# Patient Record
Sex: FEMALE | Race: BLACK OR AFRICAN AMERICAN | NOT HISPANIC OR LATINO | URBAN - METROPOLITAN AREA
[De-identification: names, ages, dates, MRNs, and addresses within clinical notes are randomized per-mention and may not be internally consistent; named-entity substitution may affect disease eponyms.]

---

## 2021-04-23 ENCOUNTER — INPATIENT (INPATIENT)
Facility: HOSPITAL | Age: 21
LOS: 3 days | Discharge: HOME | End: 2021-04-27
Attending: PSYCHIATRY & NEUROLOGY | Admitting: PSYCHIATRY & NEUROLOGY
Payer: MEDICAID

## 2021-04-23 VITALS
SYSTOLIC BLOOD PRESSURE: 113 MMHG | OXYGEN SATURATION: 97 % | RESPIRATION RATE: 18 BRPM | HEART RATE: 99 BPM | DIASTOLIC BLOOD PRESSURE: 65 MMHG | TEMPERATURE: 98 F

## 2021-04-23 DIAGNOSIS — F33.2 MAJOR DEPRESSIVE DISORDER, RECURRENT SEVERE WITHOUT PSYCHOTIC FEATURES: ICD-10-CM

## 2021-04-23 DIAGNOSIS — F32.9 MAJOR DEPRESSIVE DISORDER, SINGLE EPISODE, UNSPECIFIED: ICD-10-CM

## 2021-04-23 DIAGNOSIS — F43.10 POST-TRAUMATIC STRESS DISORDER, UNSPECIFIED: ICD-10-CM

## 2021-04-23 DIAGNOSIS — F41.9 ANXIETY DISORDER, UNSPECIFIED: ICD-10-CM

## 2021-04-23 LAB
ALBUMIN SERPL ELPH-MCNC: 4.3 G/DL — SIGNIFICANT CHANGE UP (ref 3.3–5)
ALP SERPL-CCNC: 77 U/L — SIGNIFICANT CHANGE UP (ref 40–120)
ALT FLD-CCNC: 13 U/L — SIGNIFICANT CHANGE UP (ref 4–33)
AMPHET UR-MCNC: NEGATIVE — SIGNIFICANT CHANGE UP
ANION GAP SERPL CALC-SCNC: 10 MMOL/L — SIGNIFICANT CHANGE UP (ref 7–14)
APAP SERPL-MCNC: <15 UG/ML — SIGNIFICANT CHANGE UP (ref 15–25)
APPEARANCE UR: CLEAR — SIGNIFICANT CHANGE UP
AST SERPL-CCNC: 14 U/L — SIGNIFICANT CHANGE UP (ref 4–32)
BACTERIA # UR AUTO: NEGATIVE — SIGNIFICANT CHANGE UP
BARBITURATES UR SCN-MCNC: NEGATIVE — SIGNIFICANT CHANGE UP
BENZODIAZ UR-MCNC: NEGATIVE — SIGNIFICANT CHANGE UP
BILIRUB SERPL-MCNC: 0.3 MG/DL — SIGNIFICANT CHANGE UP (ref 0.2–1.2)
BILIRUB UR-MCNC: NEGATIVE — SIGNIFICANT CHANGE UP
BUN SERPL-MCNC: 8 MG/DL — SIGNIFICANT CHANGE UP (ref 7–23)
CALCIUM SERPL-MCNC: 9.4 MG/DL — SIGNIFICANT CHANGE UP (ref 8.4–10.5)
CHLORIDE SERPL-SCNC: 103 MMOL/L — SIGNIFICANT CHANGE UP (ref 98–107)
CO2 SERPL-SCNC: 25 MMOL/L — SIGNIFICANT CHANGE UP (ref 22–31)
COCAINE METAB.OTHER UR-MCNC: NEGATIVE — SIGNIFICANT CHANGE UP
COLOR SPEC: YELLOW — SIGNIFICANT CHANGE UP
COVID-19 SPIKE DOMAIN AB INTERP: POSITIVE
COVID-19 SPIKE DOMAIN ANTIBODY RESULT: >250 U/ML — HIGH
CREAT SERPL-MCNC: 0.71 MG/DL — SIGNIFICANT CHANGE UP (ref 0.5–1.3)
CREATININE URINE RESULT, DAU: 322 MG/DL — SIGNIFICANT CHANGE UP
DIFF PNL FLD: NEGATIVE — SIGNIFICANT CHANGE UP
EPI CELLS # UR: 3 /HPF — SIGNIFICANT CHANGE UP (ref 0–5)
ETHANOL SERPL-MCNC: <10 MG/DL — SIGNIFICANT CHANGE UP
GLUCOSE SERPL-MCNC: 110 MG/DL — HIGH (ref 70–99)
GLUCOSE UR QL: NEGATIVE — SIGNIFICANT CHANGE UP
HCG SERPL-ACNC: <5 MIU/ML — SIGNIFICANT CHANGE UP
HCT VFR BLD CALC: 38.3 % — SIGNIFICANT CHANGE UP (ref 34.5–45)
HGB BLD-MCNC: 12.3 G/DL — SIGNIFICANT CHANGE UP (ref 11.5–15.5)
HYALINE CASTS # UR AUTO: 3 /LPF — SIGNIFICANT CHANGE UP (ref 0–7)
KETONES UR-MCNC: ABNORMAL
LEUKOCYTE ESTERASE UR-ACNC: NEGATIVE — SIGNIFICANT CHANGE UP
MCHC RBC-ENTMCNC: 27.6 PG — SIGNIFICANT CHANGE UP (ref 27–34)
MCHC RBC-ENTMCNC: 32.1 GM/DL — SIGNIFICANT CHANGE UP (ref 32–36)
MCV RBC AUTO: 86.1 FL — SIGNIFICANT CHANGE UP (ref 80–100)
METHADONE UR-MCNC: NEGATIVE — SIGNIFICANT CHANGE UP
NITRITE UR-MCNC: NEGATIVE — SIGNIFICANT CHANGE UP
NRBC # BLD: 0 /100 WBCS — SIGNIFICANT CHANGE UP
NRBC # FLD: 0 K/UL — SIGNIFICANT CHANGE UP
OPIATES UR-MCNC: NEGATIVE — SIGNIFICANT CHANGE UP
OXYCODONE UR-MCNC: NEGATIVE — SIGNIFICANT CHANGE UP
PCP SPEC-MCNC: SIGNIFICANT CHANGE UP
PCP UR-MCNC: NEGATIVE — SIGNIFICANT CHANGE UP
PH UR: 7 — SIGNIFICANT CHANGE UP (ref 5–8)
PLATELET # BLD AUTO: 426 K/UL — HIGH (ref 150–400)
POTASSIUM SERPL-MCNC: 4.4 MMOL/L — SIGNIFICANT CHANGE UP (ref 3.5–5.3)
POTASSIUM SERPL-SCNC: 4.4 MMOL/L — SIGNIFICANT CHANGE UP (ref 3.5–5.3)
PROT SERPL-MCNC: 7.4 G/DL — SIGNIFICANT CHANGE UP (ref 6–8.3)
PROT UR-MCNC: ABNORMAL
RBC # BLD: 4.45 M/UL — SIGNIFICANT CHANGE UP (ref 3.8–5.2)
RBC # FLD: 12 % — SIGNIFICANT CHANGE UP (ref 10.3–14.5)
RBC CASTS # UR COMP ASSIST: 2 /HPF — SIGNIFICANT CHANGE UP (ref 0–4)
SALICYLATES SERPL-MCNC: <5 MG/DL — LOW (ref 15–30)
SARS-COV-2 IGG+IGM SERPL QL IA: >250 U/ML — HIGH
SARS-COV-2 IGG+IGM SERPL QL IA: POSITIVE
SARS-COV-2 RNA SPEC QL NAA+PROBE: SIGNIFICANT CHANGE UP
SODIUM SERPL-SCNC: 138 MMOL/L — SIGNIFICANT CHANGE UP (ref 135–145)
SP GR SPEC: 1.03 — HIGH (ref 1.01–1.02)
THC UR QL: NEGATIVE — SIGNIFICANT CHANGE UP
TOXICOLOGY SCREEN, DRUGS OF ABUSE, SERUM RESULT: SIGNIFICANT CHANGE UP
TSH SERPL-MCNC: 0.72 UIU/ML — SIGNIFICANT CHANGE UP (ref 0.27–4.2)
UROBILINOGEN FLD QL: SIGNIFICANT CHANGE UP
WBC # BLD: 10.04 K/UL — SIGNIFICANT CHANGE UP (ref 3.8–10.5)
WBC # FLD AUTO: 10.04 K/UL — SIGNIFICANT CHANGE UP (ref 3.8–10.5)
WBC UR QL: 2 /HPF — SIGNIFICANT CHANGE UP (ref 0–5)

## 2021-04-23 PROCEDURE — 99285 EMERGENCY DEPT VISIT HI MDM: CPT

## 2021-04-23 PROCEDURE — 99285 EMERGENCY DEPT VISIT HI MDM: CPT | Mod: 25

## 2021-04-23 PROCEDURE — 93010 ELECTROCARDIOGRAM REPORT: CPT

## 2021-04-23 RX ORDER — SODIUM CHLORIDE 9 MG/ML
1000 INJECTION INTRAMUSCULAR; INTRAVENOUS; SUBCUTANEOUS ONCE
Refills: 0 | Status: COMPLETED | OUTPATIENT
Start: 2021-04-23 | End: 2021-04-23

## 2021-04-23 RX ORDER — BUDESONIDE AND FORMOTEROL FUMARATE DIHYDRATE 160; 4.5 UG/1; UG/1
2 AEROSOL RESPIRATORY (INHALATION)
Qty: 0 | Refills: 0 | DISCHARGE

## 2021-04-23 RX ORDER — BUDESONIDE AND FORMOTEROL FUMARATE DIHYDRATE 160; 4.5 UG/1; UG/1
2 AEROSOL RESPIRATORY (INHALATION)
Refills: 0 | Status: DISCONTINUED | OUTPATIENT
Start: 2021-04-23 | End: 2021-04-27

## 2021-04-23 RX ORDER — FLUOXETINE HCL 10 MG
10 CAPSULE ORAL DAILY
Refills: 0 | Status: DISCONTINUED | OUTPATIENT
Start: 2021-04-23 | End: 2021-04-24

## 2021-04-23 RX ORDER — ALBUTEROL 90 UG/1
2 AEROSOL, METERED ORAL EVERY 6 HOURS
Refills: 0 | Status: DISCONTINUED | OUTPATIENT
Start: 2021-04-23 | End: 2021-04-27

## 2021-04-23 RX ORDER — ACETAMINOPHEN 500 MG
650 TABLET ORAL ONCE
Refills: 0 | Status: COMPLETED | OUTPATIENT
Start: 2021-04-23 | End: 2021-04-23

## 2021-04-23 RX ORDER — HYDROXYZINE HCL 10 MG
50 TABLET ORAL EVERY 6 HOURS
Refills: 0 | Status: DISCONTINUED | OUTPATIENT
Start: 2021-04-23 | End: 2021-04-27

## 2021-04-23 RX ADMIN — Medication 650 MILLIGRAM(S): at 22:08

## 2021-04-23 RX ADMIN — Medication 650 MILLIGRAM(S): at 21:58

## 2021-04-23 RX ADMIN — SODIUM CHLORIDE 1000 MILLILITER(S): 9 INJECTION INTRAMUSCULAR; INTRAVENOUS; SUBCUTANEOUS at 13:32

## 2021-04-23 NOTE — ED BEHAVIORAL HEALTH ASSESSMENT NOTE - DETAILS
zoloft- fatigue, drowsiness self referred; aunt OD on tramadol last night reports significant trauma hx- refused to disclose details EMMETT EMMETT Temple reports trauma hx- refused to disclose details

## 2021-04-23 NOTE — ED BEHAVIORAL HEALTH NOTE - BEHAVIORAL HEALTH NOTE
COVID Exposure Screen- Patient    1.	*Have you had a COVID-19 test in the last 90 days?  (x  ) Yes   (  ) No   (  ) Unknown- Reason: _____  IF YES PROCEED TO QUESTION #2. IF NO OR UNKNOWN, PLEASE SKIP TO QUESTION #3.  2.	Date of test(s) and result(s): 3 months ago- +     3.	*Have you tested positive for COVID-19 antibodies? ( x ) Yes   (  ) No   (  ) Unknown- Reason: _____  IF YES PROCEED TO QUESTION #4. IF NO or UNKNOWN, PLEASE SKIP TO QUESTION #5.  4.	Date of positive antibody test: ________    5.	*Have you received 2 doses of the COVID-19 vaccine? (  ) Yes   (x  ) No   (  ) Unknown- Reason: _____   IF YES PROCEED TO QUESTION #6. IF NO or UNKNOWN, PLEASE SKIP TO QUESTION #7.  6.	Date of second dose: ________    7.	*In the past 10 days, have you been around anyone with a positive COVID-19 test?* (  ) Yes   ( x ) No   (  ) Unknown- Reason: ____  IF YES PROCEED TO QUESTION #8. IF NO or UNKNOWN, PLEASE SKIP TO QUESTION #13.  8.	Were you within 6 feet of them for at least 15 minutes? (  ) Yes   (  ) No   (  ) Unknown- Reason: _____  9.	Have you provided care for them? (  ) Yes   (  ) No   (  ) Unknown- Reason: ______  10.	Have you had direct physical contact with them (touched, hugged, or kissed them)? (  ) Yes   (  ) No    (  ) Unknown- Reason: _____  11.	Have you shared eating or drinking utensils with them? (  ) Yes   (  ) No    (  ) Unknown- Reason: ____  12.	Have they sneezed, coughed, or somehow gotten respiratory droplets on you? (  ) Yes   (  ) No    (  ) Unknown- Reason: ______    13.	*Have you been out of [place] within the past 10 days?* (  ) Yes   ( x ) No   (  ) Unknown- Reason: _____  IF YES PLEASE ANSWER THE FOLLOWING QUESTIONS:  14.	Which state/country have you been to? ______  15.	Were you there over 24 hours? (  ) Yes   (  ) No    (  ) Unknown- Reason: ______  16.	Date of return to [facility]: ______

## 2021-04-23 NOTE — ED BEHAVIORAL HEALTH ASSESSMENT NOTE - NSPRESENTSXS_PSY_ALL_CORE
Depressed mood/Anhedonia/Impulsivity/Hopelessness or despair/Global insomnia/Severe anxiety, agitation or panic

## 2021-04-23 NOTE — ED BEHAVIORAL HEALTH ASSESSMENT NOTE - CASE SUMMARY
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 PPH MDD, Anxiety & PTSD. No hx of inpatient admissions or suicide attempts. No history of aggression, violence or legal issues. No substance abuse problems. PMH asthma & PCOS. BIBA self referred for suicide attempt.    Patient presents to the ED in th context of suicide attempt. Patient reports taking 5 Tramdol 350mg pills last evening in a suicide attempt. Reviewed psyckes- patient was prescribed tramadol 50mg in March (likely took 5 50mg pills= 250mg total). Patient's purposeful OD precipitated by multiple psychosocial stressors and continues psychodynamic issues and homelessness. Although she denies current SI/plan/intent, she endorses worsening depression and anxiety. She has significant risk factors including limited social supports, homelessness, no protective factors and non compliance with medication. She is impulsive and unpredictable with poor judgement and insight. She presents at imminent risk to self and requires inpatient admission for safety and stabilization.

## 2021-04-23 NOTE — ED BEHAVIORAL HEALTH ASSESSMENT NOTE - NSACTIVEVENT_PSY_ALL_CORE
psychodynamic issues/Triggering events leading to humiliation, shame, and/or despair (e.g., Loss of relationship, financial or health status) (real or anticipated)/Current or pending social isolation/Pending incarceration or homelessness/Inadequate social supports

## 2021-04-23 NOTE — ED PROVIDER NOTE - PROGRESS NOTE DETAILS
Yosvany: Psychiatry consults, will see patient shortly picked up patient as signout at 4pm, awaiting psych input.  psych replied pt to be admitted, will send pt to  awaiting covid test results

## 2021-04-23 NOTE — ED PROVIDER NOTE - OBJECTIVE STATEMENT
21yo female with pmh asthma, pcos presenting after ingestion.  States last night around 11pm she took 6 350mg tabs of tramadol.  States this was an attempt to take her life.  She has had these thoughts before but never acted on them.  No other meds, drugs, alcohol.  Endorsing nausea, dizziness at this time. 21yo female with pmh asthma, pcos presenting after self-reported ingestion last night around 11pm - took 6 350mg tabs of tramadol.  States this was an attempt to take her life.  She has had these thoughts before but never acted on them until now. States she has had increasing depression and life stressors that are contributing to her worsening mood. States she had the tramadol rx from an ER in [place] for her PCOS.  No other meds, drugs, alcohol.  Endorsing nausea, dizziness at this time.      JUANITA

## 2021-04-23 NOTE — ED ADULT TRIAGE NOTE - CHIEF COMPLAINT QUOTE
BIBEMS from home for taking multiple tramadol last night at 11 pm. States she took 6 pills of 350mg each. Currently c/o nausea, fatigue, weakness. Appears weak. Reports shes been having increased feelings of depression and had suicidal thoughts. No hx of previous attempts or psychiatric admission. Cooperative in triage    Evaluated by MD. EMILEE Aparicio, pt to be seen in [facility]

## 2021-04-23 NOTE — ED PROVIDER NOTE - PHYSICAL EXAMINATION
General appearance: NAD, conversant, afebrile    Eyes: anicteric sclerae, CRISTY, EOMI   HENT: Atraumatic; oropharynx clear, MMM and no ulcerations, no pharyngeal erythema or exudate   Neck: Trachea midline; Full range of motion, supple   Pulm: CTA bl, normal respiratory effort and no intercostal retractions, normal work of breathing   CV: RRR, No murmurs, rubs, or gallops   Abdomen: Soft, non-tender, non-distended; no guarding or rebound   Extremities: No peripheral edema   Skin: Dry, normal temperature, turgor and texture; no rash   Psych: Appropriate affect, cooperative; alert and oriented to person, place and time General appearance: NAD, conversant, afebrile    Eyes: anicteric sclerae, CRISTY, EOMI   HENT: Atraumatic; oropharynx clear, MMM and no ulcerations, no pharyngeal erythema or exudate   Neck: Trachea midline; Full range of motion, supple   Pulm: CTA bl, normal respiratory effort and no intercostal retractions, normal work of breathing   CV: RRR, No murmurs, rubs, or gallops   Abdomen: Soft, non-tender, non-distended; no guarding or rebound   Extremities: No peripheral edema   Skin: Dry, normal temperature, turgor and texture; no rash   Psych: Appropriate affect, cooperative; alert and oriented to person, place and time, +SI, denies HI    JUANITA

## 2021-04-23 NOTE — ED BEHAVIORAL HEALTH ASSESSMENT NOTE - NSSUICPROTFACT_PSY_ALL_CORE
Responsibility to children, family, or others/Engaged in work or school/Positive therapeutic relationships

## 2021-04-23 NOTE — ED PROVIDER NOTE - CARE PLAN
Principal Discharge DX:	MDD (major depressive disorder)  Secondary Diagnosis:	Anxiety disorder, unspecified type  Secondary Diagnosis:	PTSD (post-traumatic stress disorder)

## 2021-04-23 NOTE — ED BEHAVIORAL HEALTH ASSESSMENT NOTE - HPI (INCLUDE ILLNESS QUALITY, SEVERITY, DURATION, TIMING, CONTEXT, MODIFYING FACTORS, ASSOCIATED SIGNS AND SYMPTOMS)
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 PPH MDD, Anxiety & PTSD. No hx of inpatient admissions or suicide attempts. No history of aggression, violence or legal issues. No substance abuse problems. PMH asthma & PCOS. BIBA self referred for suicide attempt.    Patient reports last night she took 5 Tramadol pills "it stated 350mg on the bottle." Per Psyckes patient was prescribed Tramadol 50mg in March 2021. Patient reports she was prescribed this medication for PCOS pain. She reports she took a purposeful OD of this medication to kill herself. She reports she felt overwhelmed and stressed "about everything."    Patient endorses precipitating factors include her homelessness. She stated it is extremely stressing "not knowing where I'll lay my head every night." She reports she has a strained relationship with her family. She stated she was kicked out of her mothers house at age 12, has no relationship with her father and mostly stays between her Aunt & Stepmother. She stated "they're also toxic." She has siblings but reports their interactions were minimal. She was guarded when discussing their family. Patient has been unable to get an apartment due to just recently starting work in March and also going to school full time. She works overnights and attends school during the day. Patient reports what led her to her suicide attempt last night was finding out from a friend that her stepmother doesn't want her to stay with her anymore and was saying mean things about her. She reports her stepmother was a support and finding this information out was extremely hurtful. She reports she impulsively took the OD of tramadol but did intend to kill herself.     She reports upon awakening this AM she felt physically ill and called 911. Patient denies continued SI/plan/intent. However she is unable to name any social supports. She has few protective factors stating all she has is herself. She was tearful. She reports she has worsening depression and anxiety due to "drama and stress," with her family. She refused to elaborate regarding other recent events with her family. She stated she is not attending class online because "I don't have the energy," however she continues to do well in her school work. She endorsed feelings of hopelessness regarding her living situation and issues with her family.    Patient denies any hallucinations, does not report any delusional thought content, denies thought insertion/withdrawal, denies referential thought processes & is not paranoid on interview. Patient does not report nor exhibit any signs of pratima, including irritable or elevated mood, grandiosity, pressured speech, risk-taking behaviors, increase in productivity or agitation. Patient denies  preoccupation with death or feelings of guilt. Patient adamantly denies SI, intent or plan; denies any HI, violent thoughts.     See  note for collateral information. 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 PPH MDD, Anxiety & PTSD. No hx of inpatient admissions or suicide attempts. No history of aggression, violence or legal issues. No substance abuse problems. PMH asthma & PCOS. BIBA self referred for suicide attempt.    Patient reports last night she took 5 Tramadol pills "it stated 350mg on the bottle." Per Psyckes patient was prescribed Tramadol 50mg in March 2021. Patient reports she was prescribed this medication for PCOS pain. She reports she took a purposeful OD of 5 pills this medication to kill herself. She reports she felt overwhelmed and stressed "about everything."    Patient endorses precipitating factors include her homelessness. She stated it is extremely stressing "not knowing where I'll lay my head every night." She reports she has a strained relationship with her family. She stated she was kicked out of her mothers house at age 12, has no relationship with her father and mostly stays between her Aunt & Stepmother. She stated "they're also toxic." She has siblings but reports their interactions are minimal. She was guarded when discussing family. Patient has been unable to get an apartment due to just recently starting work in March and also going to school full time. She works overnights and attends school during the day. Patient reports what led her to her suicide attempt last night was finding out from a friend that her stepmother doesn't want her to stay with her anymore and was saying mean things about her. She reports her stepmother was a support and finding this information out was extremely hurtful. She reports she impulsively took the OD of tramadol but did intend to kill herself.     She reports upon awakening this AM she felt physically ill and called 911. Patient denies continued SI/plan/intent. However she is unable to name any social supports. She has few protective factors stating all she has is herself. She was tearful. She reports she has worsening depression and anxiety due to "drama and stress," with her family. She refused to elaborate regarding other recent events with her family. She stated she is not attending class online because "I don't have the energy," however she continues to do well in her school work. She endorsed feelings of hopelessness regarding her living situation and issues with her family.    Patient denies any hallucinations, does not report any delusional thought content, denies thought insertion/withdrawal, denies referential thought processes & is not paranoid on interview. Patient does not report nor exhibit any signs of pratima, including irritable or elevated mood, grandiosity, pressured speech, risk-taking behaviors, increase in productivity or agitation. Patient denies  preoccupation with death or feelings of guilt. Patient adamantly denies SI, intent or plan; denies any HI, violent thoughts.     See  note for collateral information.

## 2021-04-23 NOTE — ED BEHAVIORAL HEALTH NOTE - BEHAVIORAL HEALTH NOTE
Worker printed [email or web address] information for provider. Worker then met with patient who provided her aunt Natasha Villegas ([phone]) for collateral information.     Patient is a 20-year-old female, staying from place to place currently (undomiciled), in mental health treatment, with a dx of anxiety and depression, BIBEMS activated by self for overdosing on pills. Pt’s aunt reports that the patient follows up for therapy at [facility] for treatment and believes she is prescribed an antidepressant and anxiety medication. 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 Aunt reports that the patient is not on any drugs or alcohol. She reports that the patient can be verbally aggressive but is not physically violent with anyone. Aunt reports that the patient is unsettled by her living situation. She states that the patient has no legal issues. 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 Patient has also been taking classes online for early childhood intervention ([employer]?). She states that the patient was living with her mother prior to being undomiciled but they do not have the best relationship. She states that the patient tested positive for covid-19 in February and has not taken the vaccine yet. She states that has no covid-19 exposure. She reports that last night the patient called her and told her that she is tired and overwhelmed. Aunt states that she did not directly make suicidal statements but sounded sad. Aunt states that the patient called her this morning and told her that she took 6-7 pills of Tramadol and she told her to call EMS. Aunt states prior to this the patient has no hx of sa. She states that the patient suffers from PCOS. She believes that the patient was crying out for help. She states that the patient has no hx of prior hospitalizations.  Case discussed with PAVEL payne. Worker printed [employer] information for provider. Worker then met with patient who provided her aunt Natasha Villegas ([phone]) for collateral information.     Patient is a 20-year-old female, staying from place to place currently (undomiciled), in mental health treatment, with a dx of anxiety and depression, BIBEMS activated by self for overdosing on pills. Pt’s aunt reports that the patient follows up for therapy at [facility] for treatment and believes she is prescribed an antidepressant and anxiety medication. 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 Aunt reports that the patient is not on any drugs or alcohol. She reports that the patient can be verbally aggressive but is not physically violent with anyone. Aunt reports that the patient is unsettled by her living situation. She states that the patient has no legal issues. 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 Patient has also been taking classes online for early childhood intervention ([employer]?). She states that the patient was living with her mother prior to being undomiciled but they do not have the best relationship. She states that the patient tested positive for covid-19 in February and has not taken the vaccine yet. She states that has no covid-19 exposure. She reports that last night the patient called her and told her that she is tired and overwhelmed. Aunt states that she did not directly make suicidal statements but sounded sad. Aunt states that the patient called her this morning and told her that she took 6-7 pills of Tramadol and she told her to call EMS. Aunt states prior to this the patient has no hx of sa. She states that the patient suffers from PCOS. She believes that the patient was crying out for help. She states that the patient has no hx of prior hospitalizations.  Case discussed with PAVEL payne.  Worker called [facility] ([phone]) to contact pt’s therapist Maria Elena. Worker left voicemail for call back.

## 2021-04-23 NOTE — ED BEHAVIORAL HEALTH NOTE - BEHAVIORAL HEALTH NOTE
Worker received a call back from pt’s therapist libby  at [facility]. All information is as per therapist.   She reports that she last spoke with the patient via telehealth on Tuesday. She states that the patient has been having issues with her step mother as of recent who she was living with at the time. She states that the patient has been saving up money for housing and this has been a huge stressor for the patient. She reports that the patient is followed by NP omid Perales  and is prescribed Zoloft 100mg in the am and hydroxyzine 25mg at bedtime . Patient last met with the NP on 4/21.  She states that the pt Did have a past ideation in November and had  thoughts to take pills and go to a  hotel but at that time she was able to talk to the patient to provide support and the patient then was able to  contract for safety.  Worker explained that patient will be admitted. Pending admission to . Worker received a call back from pt’s therapist libby  at [facility]. All information is as per therapist.   She reports that she last spoke with the patient via telehealth on Tuesday. She states that the patient has been having issues with her step mother as of recent who she was living with at the time. She states that the patient has been saving up money for housing and this has been a huge stressor for the patient. She reports that the patient is followed by NP omid Perales  and is prescribed Zoloft 100mg in the am and hydroxyzine 25mg at bedtime . Patient last met with the NP on 4/21.  She states that the pt Did have a past ideation in November and had  thoughts to take pills and go to a  hotel but at that time she was able to talk to the patient to provide support and the patient then was able to  contract for safety.  Worker explained that patient will be admitted. Pending admission to .    Worker printed psyckes information for provider.

## 2021-04-23 NOTE — ED BEHAVIORAL HEALTH ASSESSMENT NOTE - DESCRIPTION
20 year old female, homeless, attends [employer] studying early childhood special education PCOS, asthma During course of ED visit patient was calm and cooperative. Patient was not aggressive or violent and did not require PRN medications.    ICU Vital Signs Last 24 Hrs  T(C): 36.7 (23 Apr 2021 15:54), Max: 36.9 (23 Apr 2021 11:30)  T(F): 98.1 (23 Apr 2021 15:54), Max: 98.5 (23 Apr 2021 11:30)  HR: 94 (23 Apr 2021 15:54) (94 - 99)  BP: 101/60 (23 Apr 2021 15:54) (97/55 - 113/65)  BP(mean): --  ABP: --  ABP(mean): --  RR: 17 (23 Apr 2021 15:54) (17 - 18)  SpO2: 99% (23 Apr 2021 15:54) (97% - 99%)

## 2021-04-23 NOTE — ED BEHAVIORAL HEALTH ASSESSMENT NOTE - OTHER
issues with social support, issues with housing, issues with primary support not assessed, in bed homeless aunt superficially cooperative, guarded

## 2021-04-23 NOTE — ED BEHAVIORAL HEALTH ASSESSMENT NOTE - SUMMARY
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 PPH MDD, Anxiety & PTSD. No hx of inpatient admissions or suicide attempts. No history of aggression, violence or legal issues. No substance abuse problems. PMH asthma & PCOS. BIBA self referred for suicide attempt.    Patient presents to the ED in th context of suicide attempt. Patient reports taking 5 Tramdol 350mg pills last evening in a suicide attempt. Reviewed psyckes- patient was prescribed tramadol 50mg in March (likely took 5 50mg pills= 250mg total). Patient's purposeful OD precipitated by multiple psychosocial stressors and continues psychodynamic issues and homelessness. Although she denies current SI/plan/intent, she endorses worsening depression and anxiety. She has significant risk factors including limited social supports, homelessness, no protective factors and non compliance with medication. She is impulsive and unpredictable with poor judgement and insight. She presents at imminent risk to self and requires inpatient admission for safety and stabilization. 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 PPH MDD, Anxiety & PTSD. No hx of inpatient admissions or suicide attempts. No history of aggression, violence or legal issues. No substance abuse problems. PMH asthma & PCOS. BIBA self referred for suicide attempt.    Patient presents to the ED in th context of suicide attempt. Patient reports taking 5 Tramdol 350mg pills last evening in a suicide attempt. Reviewed psyckes- patient was prescribed tramadol 50mg in March (likely took 5 50mg pills= 250mg total). Patient's purposeful OD precipitated by multiple psychosocial stressors and continued psychodynamic issues and homelessness. Although she denies current SI/plan/intent, she endorses worsening depression and anxiety. She has significant risk factors including limited social supports, homelessness, no protective factors, non compliance with medication and recent suicide attempt. She is impulsive and unpredictable with poor judgement and insight. She presents at imminent risk to self and requires inpatient admission for safety and stabilization.

## 2021-04-23 NOTE — ED PROVIDER NOTE - CLINICAL SUMMARY MEDICAL DECISION MAKING FREE TEXT BOX
21yo female with pmh asthma, pcos presenting after ingestion of tramadol as suicide attempt.  >12 hours since ingestion.  EKG NSR, qtc normal.  Will check screening labs and drug screen.  Psych consult. 19yo female with pmh asthma, pcos presenting after ingestion of tramadol as suicide attempt.  >12 hours since ingestion.  EKG NSR, qtc normal.  Will check screening labs and drug screen.  Psych consult.     Of note, pt states she took 350mg tablets of tramadol but they do not appear to come in this formulation of mg.

## 2021-04-23 NOTE — ED PROVIDER NOTE - NS ED ROS FT
Constitutional: No fevers, no chills  Resp: No shortness of breath, no cough  Cardio: No chest pain, no palpitations, no peripheral edema, no syncope  GI: No abdominal pain, + nausea, no vomiting, no diarrhea  : No dysuria, no urinary frequency  MSK: No back pain, no neck pain  Skin: No rash, no lacerations, no bruising  Neuro: No headache, no numbness, no weakness
Name band;

## 2021-04-23 NOTE — ED BEHAVIORAL HEALTH ASSESSMENT NOTE - RISK ASSESSMENT
modifiable risk factors- depression, anxiety, hopelessness, poor appetite/sleep, non compliance with medication, impulsive behavior, poor coping skills, limited support    unmodifiable risk factors- hx of trauma, recent suicide attempt    protective factors- no hx of admissions, no pratima/hypomania, no psychosis, no legal issues, no substance abuse issues High Acute Suicide Risk

## 2021-04-23 NOTE — ED BEHAVIORAL HEALTH ASSESSMENT NOTE - OTHER PAST PSYCHIATRIC HISTORY (INCLUDE DETAILS REGARDING ONSET, COURSE OF ILLNESS, INPATIENT/OUTPATIENT TREATMENT)
PPH MDD, Anxiety & PTSD. No hx of inpatient admissions or suicide attempts. Outpatient tx at [facility] with therapist Maria Elena. Psychiatrist recently changed- does not recall name.

## 2021-04-23 NOTE — ED BEHAVIORAL HEALTH NOTE - BEHAVIORAL HEALTH NOTE
Worker called [facility] first ([phone]) and spoke to Marcos DUMONT who provided reference #[id number] who states that [facility] first will reach out on the next business day and can also be reached at [phone] Worker called [employer] first ([phone]) and spoke to Marcos DUMONT who provided reference #[id number] who states that [facility] first will reach out on the next business day and can also be reached at [phone]. Worker called [facility] ([phone]) and Spoke to Meli who will inform pt’s therapist of admission to [facility].  Worker called patient’s aunt Natasha ([phone]) and left a message informing about pt admission.

## 2021-04-24 LAB
CULTURE RESULTS: SIGNIFICANT CHANGE UP
SPECIMEN SOURCE: SIGNIFICANT CHANGE UP

## 2021-04-24 PROCEDURE — 99222 1ST HOSP IP/OBS MODERATE 55: CPT

## 2021-04-24 RX ORDER — ACETAMINOPHEN 500 MG
650 TABLET ORAL EVERY 6 HOURS
Refills: 0 | Status: DISCONTINUED | OUTPATIENT
Start: 2021-04-24 | End: 2021-04-27

## 2021-04-24 RX ADMIN — BUDESONIDE AND FORMOTEROL FUMARATE DIHYDRATE 2 PUFF(S): 160; 4.5 AEROSOL RESPIRATORY (INHALATION) at 21:49

## 2021-04-24 RX ADMIN — Medication 650 MILLIGRAM(S): at 17:04

## 2021-04-24 NOTE — BH INPATIENT PSYCHIATRY ASSESSMENT NOTE - RISK ASSESSMENT
elevated acute suicide risk due to attempt prior to admission and poor social supports and numerous stressors.

## 2021-04-24 NOTE — BH INPATIENT PSYCHIATRY ASSESSMENT NOTE - NSBHCHARTREVIEWVS_PSY_A_CORE FT
Vital Signs Last 24 Hrs  T(C): 36.9 (23 Apr 2021 21:50), Max: 36.9 (23 Apr 2021 21:50)  T(F): 98.4 (23 Apr 2021 21:50), Max: 98.4 (23 Apr 2021 21:50)  HR: 89 (23 Apr 2021 19:36) (89 - 97)  BP: 98/51 (23 Apr 2021 19:36) (97/55 - 101/60)  BP(mean): --  RR: 18 (23 Apr 2021 19:36) (17 - 18)  SpO2: 99% (23 Apr 2021 19:36) (98% - 99%)

## 2021-04-24 NOTE — PSYCHIATRIC REHAB INITIAL EVALUATION - NSBHPRTOOLBOX_PSY_ALL_CORE
Patient reports coping skills such as talking to her siblings, and spending time with family and friends./Family support/Friend support

## 2021-04-24 NOTE — PSYCHIATRIC REHAB INITIAL EVALUATION - NSBHPRRECOMMEND_PSY_ALL_CORE
Writer met with patient in order to orient patient to unit, and introduce patient to psychiatric staff and department functions. Patient was verbal, polite, and forthcoming with personal information. Patient denies SI and reports what she did was impulsive. Patient reports she has a lot to live for, and reports having "an amazing" support system. Writer collaborated with patient to select an appropriate psychiatric rehabilitation goal. Psychiatric rehabilitation staff will continue to engage patient daily in order to develop therapeutic rapport. In response to COVID19, unit programming will be re-evaluated on a consistent basis in effort to maintain safety guidelines.

## 2021-04-24 NOTE — BH INPATIENT PSYCHIATRY ASSESSMENT NOTE - NSBHASSESSSUMMFT_PSY_ALL_CORE
Patient now denying mood sxs, remains elevated risk.  Patient not interested.  in psych meds.      --Inpatient hospitalization for acute stabilization and treatment  --Individual, group, and milieu therapy  --Ongoing collaboration with family and outpatient providers.

## 2021-04-24 NOTE — PSYCHIATRIC REHAB INITIAL EVALUATION - NSBHSTRENGTHHOME_PSY_ALL_CORE
Patient is currently domiciled in multiple homes. Patient reports she is homeless at the moment, but reports bouncing from home to home between her stepmother, aunt, and grandmother.

## 2021-04-24 NOTE — BH PATIENT PROFILE - HOME MEDICATIONS
budesonide-formoterol 80 mcg-4.5 mcg/inh inhalation aerosol , 2 puff(s) inhaled 2 times a day  albuterol 90 mcg/inh inhalation aerosol , 2 puff(s) inhaled every 6 hours

## 2021-04-24 NOTE — BH INPATIENT PSYCHIATRY ASSESSMENT NOTE - MSE UNSTRUCTURED FT
Patient is calm, cooperative, appropriately behaved, well-related, good EC. No PMR/PMA, no abnormal movements. Speech is normal rate, volume, and tone. Mood "I feel fine today" and affect is euthymic, mood congruent and appropriate to content. TP linear and logical. Denies SI/HI/AVH/PI. Cognition grossly intact. I&J impaired

## 2021-04-24 NOTE — BH INPATIENT PSYCHIATRY ASSESSMENT NOTE - DESCRIPTION
20 year old female, homeless, attends [employer] studying early childhood special education

## 2021-04-24 NOTE — PSYCHIATRIC REHAB INITIAL EVALUATION - NSBHALCSUBTREAT_PSY_ALL_CORE
Patient was currently receiving outpatient therapy, and was taking psychotropic medication prior to admission.

## 2021-04-24 NOTE — BH INPATIENT PSYCHIATRY ASSESSMENT NOTE - CURRENT MEDICATION
MEDICATIONS  (STANDING):  budesonide  80 MICROgram(s)/formoterol 4.5 MICROgram(s) Inhaler 2 Puff(s) Inhalation two times a day  FLUoxetine 10 milliGRAM(s) Oral daily    MEDICATIONS  (PRN):  ALBUTerol    90 MICROgram(s) HFA Inhaler 2 Puff(s) Inhalation every 6 hours PRN asthma  hydrOXYzine hydrochloride 50 milliGRAM(s) Oral every 6 hours PRN Anxiety  LORazepam     Tablet 1 milliGRAM(s) Oral every 6 hours PRN agitation  LORazepam   Injectable 2 milliGRAM(s) IntraMuscular once PRN extreme anxiety/agitation

## 2021-04-24 NOTE — PSYCHIATRIC REHAB INITIAL EVALUATION - NSBHEMPSTRENGTHSFT_PSY_ALL_CORE
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 Patient also reports she enjoys going to work 
no

## 2021-04-24 NOTE — BH INPATIENT PSYCHIATRY ASSESSMENT NOTE - DETAILS
OD on tramadol last night reports trauma hx- refused to disclose details zoloft- fatigue, drowsiness
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
## 2021-04-25 PROCEDURE — 99232 SBSQ HOSP IP/OBS MODERATE 35: CPT

## 2021-04-25 RX ADMIN — BUDESONIDE AND FORMOTEROL FUMARATE DIHYDRATE 2 PUFF(S): 160; 4.5 AEROSOL RESPIRATORY (INHALATION) at 20:29

## 2021-04-25 RX ADMIN — BUDESONIDE AND FORMOTEROL FUMARATE DIHYDRATE 2 PUFF(S): 160; 4.5 AEROSOL RESPIRATORY (INHALATION) at 09:00

## 2021-04-26 PROCEDURE — 90853 GROUP PSYCHOTHERAPY: CPT

## 2021-04-26 PROCEDURE — 99232 SBSQ HOSP IP/OBS MODERATE 35: CPT | Mod: 25

## 2021-04-26 PROCEDURE — 90834 PSYTX W PT 45 MINUTES: CPT | Mod: 59

## 2021-04-26 RX ORDER — ALBUTEROL 90 UG/1
2 AEROSOL, METERED ORAL
Qty: 0 | Refills: 0 | DISCHARGE

## 2021-04-26 RX ADMIN — BUDESONIDE AND FORMOTEROL FUMARATE DIHYDRATE 2 PUFF(S): 160; 4.5 AEROSOL RESPIRATORY (INHALATION) at 20:22

## 2021-04-26 RX ADMIN — BUDESONIDE AND FORMOTEROL FUMARATE DIHYDRATE 2 PUFF(S): 160; 4.5 AEROSOL RESPIRATORY (INHALATION) at 09:20

## 2021-04-26 NOTE — BH PSYCHOLOGY - GROUP THERAPY NOTE - NSPSYCHOLGRPDBTPT_PSY_A_CORE FT
DBT Group is a group in which patients learn skills to better manage their emotions and behaviors. Group begins with a mindfulness practice so that patients have an opportunity to practice observing their internal and external experiences.  Following the mindfulness exercise the group learns new skills in a didactic format. Group today focused on the “distress tolerance” module, which are skills to help patients manage their crisis urges.  Specifically, pts learned the skill of “radical acceptance,” which includes accepting painful situations in their lives they cannot change through turning the mind and practicing willingness. Patients were asked to determine difficult situations in their lives they needed to work on accepting, and provided examples of ways to practice this while in the hospital.

## 2021-04-26 NOTE — BH DISCHARGE NOTE NURSING/SOCIAL WORK/PSYCH REHAB - PATIENT PORTAL LINK FT
You can access the FollowMyHealth Patient Portal offered by [facility] by registering at the following website: [email or web address]. By joining [facility]’s FollowMyHealth portal, you will also be able to view your health information using other applications (apps) compatible with our system.

## 2021-04-26 NOTE — BH SOCIAL WORK INITIAL PSYCHOSOCIAL EVALUATION - NSPTSTATEDGOAL_PSY_ALL_CORE
I feel that I've learned so much more about myself, this was impulsive and I feel I have the ability to deal with my life stressors.
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
## 2021-04-26 NOTE — BH DISCHARGE NOTE NURSING/SOCIAL WORK/PSYCH REHAB - NSDCPRGOAL_PSY_ALL_CORE
Patient has demonstrated significant progress towards psychiatric rehabilitation goals during the current hospitalization. Through utilization of groups and individual sessions, patient has achieved goals. In regards to symptoms, patient reports improvements in symptoms of depression and suicidal impulsivity. Patient suicidal or self harm ideations. Patient  states she will continue to follow treatment teams wellness plan in order to remain stable towards recovery. Patient was provided with a survey to complete prior to discharge.

## 2021-04-26 NOTE — BH PSYCHOLOGY - GROUP THERAPY NOTE - NSPSYCHOLGRPDBTGOAL_PSY_A_CORE
reduce mood and affective lability/reduce suicidal ideation/risk of attempt/improve ability to indentify feelings/improve ability to communicate feelings/reduce vulnerability to emotional dysregualation

## 2021-04-26 NOTE — BH DISCHARGE NOTE NURSING/SOCIAL WORK/PSYCH REHAB - NSCDUDCCRISIS_PSY_A_CORE
[facility]  1 (614) [facility]-WELL ([phone])  Text "WELL" to 95306  Website: www.[employer]/.[facility] 1 (472) 891-NIQZ (5889) Website: www.safehorizon.org/.National Suicide Prevention Lifeline 3 (243) 948-0382/.  [facility]  1 (109) L[facility] (956-4312)/.  [facility]  [address] [place] [address] [phone]   Hours:  Monday through Friday from 9 AM to 3 PM/.  [employer] - [facility] Crisis Line  0 [phone], Option 1

## 2021-04-26 NOTE — BH SOCIAL WORK INITIAL PSYCHOSOCIAL EVALUATION - NSCMSPTSTRENGTHS_PSY_ALL_CORE
Able to adapt/Compliance to treatment/Courageous/Expressive of emotions/Dee/spirituality/Flexibility/Future/goal oriented/Highly motivated for treatment/Intelligence/Resourceful/Self confidence/Self-reliant/Supportive family

## 2021-04-27 VITALS — SYSTOLIC BLOOD PRESSURE: 99 MMHG | HEART RATE: 78 BPM | DIASTOLIC BLOOD PRESSURE: 58 MMHG | TEMPERATURE: 98 F

## 2021-04-27 PROCEDURE — 99238 HOSP IP/OBS DSCHRG MGMT 30/<: CPT

## 2021-04-27 PROCEDURE — 90832 PSYTX W PT 30 MINUTES: CPT

## 2021-04-27 RX ADMIN — BUDESONIDE AND FORMOTEROL FUMARATE DIHYDRATE 2 PUFF(S): 160; 4.5 AEROSOL RESPIRATORY (INHALATION) at 08:39

## 2021-04-27 NOTE — BH PSYCHOLOGY - CLINICIAN PSYCHOTHERAPY NOTE - NSBHPSYCHOLGOALS_PSY_A_CORE
Decrease symptoms/Assessment/Improve social/vocational/coping skills/Psychoeducation
Decrease symptoms/Assessment/Improve social/vocational/coping skills/Psychoeducation

## 2021-04-27 NOTE — BH INPATIENT PSYCHIATRY PROGRESS NOTE - NSBHMETABOLIC_PSY_ALL_CORE_FT
BMI:   HbA1c:   Glucose:   BP: 116/66 (04-25-21 @ 06:33) (97/55 - 116/66)  Lipid Panel: 
BMI:   HbA1c:   Glucose:   BP: 99/58 (04-27-21 @ 08:07) (99/58 - 116/66)  Lipid Panel: 
BMI:   HbA1c:   Glucose:   BP: 108/66 (04-26-21 @ 08:00) (98/51 - 116/66)  Lipid Panel:

## 2021-04-27 NOTE — BH PSYCHOLOGY - CLINICIAN PSYCHOTHERAPY NOTE - NSTXDEPRESGOAL_PSY_ALL_CORE
Will identify thoughts and self-talk that contribute to depression
Will identify thoughts and self-talk that contribute to depression

## 2021-04-27 NOTE — BH PSYCHOLOGY - CLINICIAN PSYCHOTHERAPY NOTE - NSTXSUICIDGOAL_PSY_ALL_CORE
Will express feelings associated with suicidal ideation
Will express feelings associated with suicidal ideation
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
## 2021-04-27 NOTE — BH INPATIENT PSYCHIATRY PROGRESS NOTE - NSICDXBHPRIMARYDX_PSY_ALL_CORE
Unspecified mood [affective] disorder   F39  

## 2021-04-27 NOTE — BH INPATIENT PSYCHIATRY PROGRESS NOTE - CURRENT MEDICATION
MEDICATIONS  (STANDING):  budesonide  80 MICROgram(s)/formoterol 4.5 MICROgram(s) Inhaler 2 Puff(s) Inhalation two times a day    MEDICATIONS  (PRN):  acetaminophen   Tablet .. 650 milliGRAM(s) Oral every 6 hours PRN Mild Pain (1 - 3), Moderate Pain (4 - 6)  ALBUTerol    90 MICROgram(s) HFA Inhaler 2 Puff(s) Inhalation every 6 hours PRN asthma  hydrOXYzine hydrochloride 50 milliGRAM(s) Oral every 6 hours PRN Anxiety  LORazepam     Tablet 1 milliGRAM(s) Oral every 6 hours PRN agitation  LORazepam   Injectable 2 milliGRAM(s) IntraMuscular once PRN extreme anxiety/agitation  

## 2021-04-27 NOTE — BH INPATIENT PSYCHIATRY PROGRESS NOTE - PRN MEDS
MEDICATIONS  (PRN):  acetaminophen   Tablet .. 650 milliGRAM(s) Oral every 6 hours PRN Mild Pain (1 - 3), Moderate Pain (4 - 6)  ALBUTerol    90 MICROgram(s) HFA Inhaler 2 Puff(s) Inhalation every 6 hours PRN asthma  hydrOXYzine hydrochloride 50 milliGRAM(s) Oral every 6 hours PRN Anxiety  LORazepam     Tablet 1 milliGRAM(s) Oral every 6 hours PRN agitation  LORazepam   Injectable 2 milliGRAM(s) IntraMuscular once PRN extreme anxiety/agitation  

## 2021-04-27 NOTE — BH PSYCHOLOGY - CLINICIAN PSYCHOTHERAPY NOTE - NSBHPSYCHOLDISCUSS_PSY_A_CORE
Discussion with collaborating staff took place since patient's last visit
Discussion with collaborating staff took place since patient's last visit

## 2021-04-27 NOTE — BH INPATIENT PSYCHIATRY DISCHARGE NOTE - NSDCMRMEDTOKEN_GEN_ALL_CORE_FT
albuterol 90 mcg/inh inhalation aerosol: 2 puff(s) inhaled every 6 hours, As Needed  budesonide-formoterol 80 mcg-4.5 mcg/inh inhalation aerosol: 2 puff(s) inhaled 2 times a day

## 2021-04-27 NOTE — BH INPATIENT PSYCHIATRY PROGRESS NOTE - NSCGISEVERILLNESS_PSY_ALL_CORE
3 = Mildly ill – clearly established symptoms with minimal, if any, distress or difficulty in social and occupational function
2 = Borderline mentally ill – subtle or suspected pathology

## 2021-04-27 NOTE — BH INPATIENT PSYCHIATRY PROGRESS NOTE - NSBHCHARTREVIEWVS_PSY_A_CORE FT
Vital Signs Last 24 Hrs  T(C): 36.2 (25 Apr 2021 06:33), Max: 36.5 (24 Apr 2021 20:42)  T(F): 97.2 (25 Apr 2021 06:33), Max: 97.7 (24 Apr 2021 20:42)  HR: 68 (25 Apr 2021 06:33) (68 - 68)  BP: 116/66 (25 Apr 2021 06:33) (116/66 - 116/66)  BP(mean): --  RR: 16 (25 Apr 2021 06:33) (16 - 16)  SpO2: --
Vital Signs Last 24 Hrs  T(C): 36.6 (27 Apr 2021 08:07), Max: 37.1 (26 Apr 2021 20:27)  T(F): 97.9 (27 Apr 2021 08:07), Max: 98.8 (26 Apr 2021 20:27)  HR: 78 (27 Apr 2021 08:07) (78 - 78)  BP: 99/58 (27 Apr 2021 08:07) (99/58 - 99/58)  BP(mean): --  RR: --  SpO2: --
Vital Signs Last 24 Hrs  T(C): 36.3 (26 Apr 2021 08:00), Max: 36.9 (25 Apr 2021 20:46)  T(F): 97.4 (26 Apr 2021 08:00), Max: 98.4 (25 Apr 2021 20:46)  HR: 66 (26 Apr 2021 08:00) (66 - 66)  BP: 108/66 (26 Apr 2021 08:00) (108/66 - 108/66)  BP(mean): --  RR: --  SpO2: --

## 2021-04-27 NOTE — BH INPATIENT PSYCHIATRY PROGRESS NOTE - NSBHCONSULTIPREASON_PSY_A_CORE
danger to self; mental illness expected to respond to inpatient care

## 2021-04-27 NOTE — BH INPATIENT PSYCHIATRY DISCHARGE NOTE - HOSPITAL COURSE
On admission to the unit, the patient denied any mood symptoms, no SI, and her behavior was always well controlled.  She declined any medications, did not think she needed them.  She did attend groups, met with the psychologist, was social with peers, and visible in the milieu.  The patient expressed remorse for her suicide attempt.  She had been under the impression that her step-mother was upset with her, which was troubling to the patient.  The patient was able to identify more appropriate coping skills in the future.  She also spoke to her step-mother and confirmed that she wasn’t upset with the patient, which made her feel better.    She was observed for a few days on the unit.  She continues to do well, and showed no symptoms of depression.  No SI, and controlled behavior.    On the day of discharge, the patient has continued to show improvement in symptoms.  She denies any depression, anxiety, or SI, and her behavior has been well controlled.  The patient expresses regret for her behavior prior to admission, and is able to discuss alternative actions in the future.  The patient has been sleeping well.  The patient has been fully engaged in treatment on the unit, compliant with medications, and is looking forward to continuing care as an outpatient.  The patient has support from her friends and family, who are also protective factors for her.  She is also engaged in work and school.  She was able to safety plan appropriately.  The patient’s risk cannot be further decreased with continued inpatient hospitalization.  She is no longer an acute danger to herself or others, and is stable for discharge home.    Discharge psychiatric medications: None

## 2021-04-27 NOTE — BH INPATIENT PSYCHIATRY PROGRESS NOTE - NSTXDEPRESGOAL_PSY_ALL_CORE
Will identify thoughts and self-talk that contribute to depression

## 2021-04-27 NOTE — BH INPATIENT PSYCHIATRY PROGRESS NOTE - MSE UNSTRUCTURED FT
Patient is calm, cooperative, appropriately behaved, well-related, good EC. No PMR/PMA, no abnormal movements. Speech is normal rate, volume, and tone. Mood "I feel better" and affect is euthymic, mood congruent and appropriate to content. TP linear and logical. Denies SI/HI/AVH/PI. Cognition grossly intact. I&J impaired 
The patient appears stated age, fair hygiene, dressed appropriately.  She was calm, cooperative with the interview.  She maintained appropriate eye contact.  No psychomotor agitation or retardation.  Steady gait.  The patient’s speech was fluent, normal in tone, rate, and volume.  The patient’s mood is “good.”  Affect is full, reactive, stable, appropriate.  The patient’s thoughts are goal directed.  She denies any delusions or hallucinations.  She denies any suicidal or homicidal ideation, intent, or plan.  Insight is fair.  Judgment is fair.  Impulse control has been fair on the unit.
The patient appears stated age, fair hygiene, dressed appropriately.  She was calm, cooperative with the interview.  She maintained appropriate eye contact.  No psychomotor agitation or retardation.  Steady gait.  The patient’s speech was fluent, normal in tone, rate, and volume.  The patient’s mood is “good.”  Affect is full, reactive, stable, appropriate.  The patient’s thoughts are goal directed.  She denies any delusions or hallucinations.  She denies any suicidal or homicidal ideation, intent, or plan.  Insight is fair.  Judgment is fair.  Impulse control has been fair on the unit.

## 2021-04-27 NOTE — BH INPATIENT PSYCHIATRY PROGRESS NOTE - NSTXSUICIDGOAL_PSY_ALL_CORE
Will express feelings associated with suicidal ideation

## 2021-04-27 NOTE — BH INPATIENT PSYCHIATRY PROGRESS NOTE - NSBHINPTBILLING_PSY_ALL_CORE
02245 - Hospital Discharge Day Management; 30 min or less
63273 - Inpatient Moderate Complexity
31184 - Inpatient Moderate Complexity

## 2021-04-27 NOTE — BH INPATIENT PSYCHIATRY DISCHARGE NOTE - DESCRIPTION
20 year old female, homeless, attends [employer] studying early childhood special education

## 2021-04-27 NOTE — BH SAFETY PLAN - STEP 6 SAFE ENVIRONMENT
1. Stay active in my prayers and more in touch with my spirituality  2. Don't make assumptions and get clarity in situations

## 2021-04-27 NOTE — BH INPATIENT PSYCHIATRY PROGRESS NOTE - NSTREATMENTCERTY_PSY_ALL_CORE

## 2021-04-27 NOTE — BH INPATIENT PSYCHIATRY PROGRESS NOTE - NSCGIIMPROVESX_PSY_ALL_CORE
2 = Much improved - notably better with signficant reduction of symptoms; increase in the level of functioning but some symptoms remain
3 = Minimally improved - slightly better with little or no clinically meaningful reduction of symptoms.  Represents very little change in basic clinical status, level of care, or functional capacity.

## 2021-04-27 NOTE — BH INPATIENT PSYCHIATRY PROGRESS NOTE - NSBHASSESSSUMMFT_PSY_ALL_CORE
mood sxs improving.  no SI. continue current treatment
The patient has continued to show improvement in symptoms.  She denies any depression, anxiety, or SI, and her behavior has been well controlled.  The patient expresses regret for her behavior prior to admission, and is able to discuss alternative actions in the future.  The patient has been sleeping well.  The patient has been fully engaged in treatment on the unit, compliant with medications, and is looking forward to continuing care as an outpatient.  The patient has support from her friends and family, who are also protective factors for her.  She is also engaged in work and school.  She was able to safety plan appropriately.  The patient’s risk cannot be further decreased with continued inpatient hospitalization.  She is no longer an acute danger to herself or others, and is stable for discharge home.  -Discharge home
The patient is showing improvement in mood, denies any depression or suicidal ideation, and behavior has been well controlled.  The patient has been engaged on the unit, attending groups, and social with peers.  -Continue to monitor  -No medications required at this time.

## 2021-04-27 NOTE — BH INPATIENT PSYCHIATRY DISCHARGE NOTE - NSDCCPCAREPLAN_GEN_ALL_CORE_FT
PRINCIPAL DISCHARGE DIAGNOSIS  Diagnosis: Adjustment disorder with mixed anxiety and depressed mood  Assessment and Plan of Treatment:       SECONDARY DISCHARGE DIAGNOSES  Diagnosis: Anxiety disorder, unspecified type  Assessment and Plan of Treatment:     Diagnosis: PTSD (post-traumatic stress disorder)  Assessment and Plan of Treatment:

## 2021-04-27 NOTE — BH PSYCHOLOGY - CLINICIAN PSYCHOTHERAPY NOTE - NSBHPSYCHOLINT_PSY_A_CORE
Cognitive/behavioral therapy/Dialectical  Behavioral Therapy (DBT)
Cognitive/behavioral therapy/Dialectical  Behavioral Therapy (DBT)

## 2021-04-27 NOTE — BH INPATIENT PSYCHIATRY PROGRESS NOTE - NSBHFUPINTERVALHXFT_PSY_A_CORE
Patient seen for follow up of depression and SI.  Case reviewed with comprehensive treatment team.  No acute events overnight.  Good behavioral control.  Good self-care.  Patient is compliant with medications with no reported or observed side effects.  Patient is eating and sleeping well.  Patient is engaged in treatment.  Patient reports feeling better overall and now denies depressed mood, feels more hopeful, feels more supported by friends and family, is grateful to be alive and is working on her safety plan.  
Patient seen for follow up of depression and SI.  Case reviewed with comprehensive treatment team.  No acute events overnight.  The patient states she has been doing well.  She denies any depression, or SI, and behavioral has been well controlled.  The patient is remorseful of her suicide attempt, states it was stupid to do, but she was distressed in the moment.  She is able to identify multiple protective factors and coping skills she could use in the future.  Patient is eating and sleeping well.  She has declined medications for depression.
Patient seen for follow up of depression and SI.  Case reviewed with comprehensive treatment team.  No acute events overnight.  The patient continues to do well on the unit.  She denies any depression, or SI, and behavioral has been well controlled.  She states she is excited for discharge, looking forward to seeing her family, and returning to work and school.  The patient continues to be remorseful of her suicide attempt, and is able to safety plan more appropriate ways to cope.  Patient is eating and sleeping well.

## 2022-03-28 PROBLEM — E28.2 POLYCYSTIC OVARIAN SYNDROME: Chronic | Status: ACTIVE | Noted: 2021-04-23

## 2022-03-28 PROBLEM — J45.909 UNSPECIFIED ASTHMA, UNCOMPLICATED: Chronic | Status: ACTIVE | Noted: 2021-04-23

## 2022-04-11 ENCOUNTER — APPOINTMENT (OUTPATIENT)
Dept: INTERNAL MEDICINE | Facility: CLINIC | Age: 22
End: 2022-04-11

## 2022-06-15 ENCOUNTER — APPOINTMENT (OUTPATIENT)
Dept: INTERNAL MEDICINE | Facility: CLINIC | Age: 22
End: 2022-06-15

## 2022-06-15 PROBLEM — Z00.00 ENCOUNTER FOR PREVENTIVE HEALTH EXAMINATION: Status: ACTIVE | Noted: 2022-06-15

## 2022-09-21 ENCOUNTER — APPOINTMENT (OUTPATIENT)
Dept: INTERNAL MEDICINE | Facility: CLINIC | Age: 22
End: 2022-09-21